# Patient Record
Sex: MALE | Race: OTHER | URBAN - METROPOLITAN AREA
[De-identification: names, ages, dates, MRNs, and addresses within clinical notes are randomized per-mention and may not be internally consistent; named-entity substitution may affect disease eponyms.]

---

## 2018-12-17 ENCOUNTER — IMPORTED ENCOUNTER (OUTPATIENT)
Dept: URBAN - METROPOLITAN AREA CLINIC 50 | Facility: CLINIC | Age: 83
End: 2018-12-17

## 2020-03-04 ENCOUNTER — IMPORTED ENCOUNTER (OUTPATIENT)
Dept: URBAN - METROPOLITAN AREA CLINIC 50 | Facility: CLINIC | Age: 85
End: 2020-03-04

## 2021-04-17 ASSESSMENT — VISUAL ACUITY
OS_CC: J1+
OD_CC: J1+
OD_CC: J1+
OS_CC: 20/20
OS_CC: J1+
OS_CC: 20/20
OD_CC: 20/20
OD_CC: 20/20

## 2021-04-17 ASSESSMENT — TONOMETRY
OS_IOP_MMHG: 15
OD_IOP_MMHG: 16
OS_IOP_MMHG: 18
OD_IOP_MMHG: 18

## 2021-07-26 ENCOUNTER — PREPPED CHART (OUTPATIENT)
Dept: URBAN - METROPOLITAN AREA CLINIC 49 | Facility: CLINIC | Age: 86
End: 2021-07-26

## 2021-07-30 ENCOUNTER — ANNUAL COMPREHENSIVE EXAM (OUTPATIENT)
Dept: URBAN - METROPOLITAN AREA CLINIC 49 | Facility: CLINIC | Age: 86
End: 2021-07-30

## 2021-07-30 DIAGNOSIS — H53.2: ICD-10-CM

## 2021-07-30 DIAGNOSIS — H04.123: ICD-10-CM

## 2021-07-30 DIAGNOSIS — H43.813: ICD-10-CM

## 2021-07-30 DIAGNOSIS — H35.362: ICD-10-CM

## 2021-07-30 DIAGNOSIS — E11.9: ICD-10-CM

## 2021-07-30 PROCEDURE — 92134 CPTRZ OPH DX IMG PST SGM RTA: CPT

## 2021-07-30 PROCEDURE — 92014 COMPRE OPH EXAM EST PT 1/>: CPT

## 2021-07-30 ASSESSMENT — VISUAL ACUITY
OS_CC: J1+@16"
OD_SC: 20/30
OS_SC: 20/40
OU_CC: 20/20
OD_CC: J1+@16"
OS_SC: J7@16"
OU_CC: J1+@16"
OD_CC: 20/25-1
OD_SC: J7@16"
OS_CC: 20/20-1

## 2021-07-30 ASSESSMENT — KERATOMETRY
OD_AXISANGLE2_DEGREES: 008
OS_K1POWER_DIOPTERS: 43.25
OD_K1POWER_DIOPTERS: 43.50
OD_AXISANGLE_DEGREES: 98
OD_K2POWER_DIOPTERS: 44.25
OS_AXISANGLE_DEGREES: 066
OS_AXISANGLE2_DEGREES: 156
OS_K2POWER_DIOPTERS: 44.50

## 2021-07-30 ASSESSMENT — TONOMETRY
OS_IOP_MMHG: 13
OD_IOP_MMHG: 19

## 2021-07-30 NOTE — PATIENT DISCUSSION
Patient currently has prism in his glasses and is currently experiencing intermittent diplopia. Will schedule patient for Motility check.

## 2021-09-21 ENCOUNTER — MOTILITY CHECK (OUTPATIENT)
Dept: URBAN - METROPOLITAN AREA CLINIC 49 | Facility: CLINIC | Age: 86
End: 2021-09-21

## 2021-09-21 DIAGNOSIS — H53.2: ICD-10-CM

## 2021-09-21 PROCEDURE — 92015 DETERMINE REFRACTIVE STATE: CPT

## 2021-09-21 PROCEDURE — 92060 SENSORIMOTOR EXAMINATION: CPT

## 2021-09-21 ASSESSMENT — VISUAL ACUITY
OS_CC: J1
OD_CC: 20/20
OS_CC: 20/20
OU_CC: J1+
OD_CC: J1
OU_CC: 20/20

## 2021-09-21 ASSESSMENT — KERATOMETRY
OD_K2POWER_DIOPTERS: 44.25
OD_AXISANGLE2_DEGREES: 008
OS_AXISANGLE2_DEGREES: 156
OD_AXISANGLE_DEGREES: 98
OS_AXISANGLE_DEGREES: 066
OS_K1POWER_DIOPTERS: 43.25
OS_K2POWER_DIOPTERS: 44.50
OD_K1POWER_DIOPTERS: 43.50

## 2021-09-21 NOTE — PATIENT DISCUSSION
Educated patient on today's findings. Alternating Esotropia at distance. Mild exophoria at near. EOMS full, no APD.

## 2021-09-21 NOTE — PATIENT DISCUSSION
Patient is happy with current TF at distance. Patient denies diplopia at distance with TF. Recommend NVOs for reading due to difference in prism between distance/near. Patient did not accept any prism at near.

## 2022-10-07 ENCOUNTER — ESTABLISHED PATIENT (OUTPATIENT)
Dept: URBAN - METROPOLITAN AREA CLINIC 53 | Facility: CLINIC | Age: 87
End: 2022-10-07

## 2022-10-07 DIAGNOSIS — H04.123: ICD-10-CM

## 2022-10-07 DIAGNOSIS — H53.2: ICD-10-CM

## 2022-10-07 DIAGNOSIS — E11.9: ICD-10-CM

## 2022-10-07 DIAGNOSIS — H43.813: ICD-10-CM

## 2022-10-07 DIAGNOSIS — H52.4: ICD-10-CM

## 2022-10-07 DIAGNOSIS — H35.362: ICD-10-CM

## 2022-10-07 PROCEDURE — 92134 CPTRZ OPH DX IMG PST SGM RTA: CPT

## 2022-10-07 PROCEDURE — 92014 COMPRE OPH EXAM EST PT 1/>: CPT

## 2022-10-07 ASSESSMENT — KERATOMETRY
OD_K2POWER_DIOPTERS: 44.25
OD_K1POWER_DIOPTERS: 43.50
OS_K1POWER_DIOPTERS: 43.25
OS_AXISANGLE2_DEGREES: 156
OS_K2POWER_DIOPTERS: 44.50
OD_AXISANGLE2_DEGREES: 008
OS_AXISANGLE_DEGREES: 066
OD_AXISANGLE_DEGREES: 98

## 2022-10-07 ASSESSMENT — VISUAL ACUITY
OD_CC: 20/20-2
OU_CC: 20/20
OU_CC: J1 @ 14"
OS_CC: 20/25+2

## 2022-10-07 ASSESSMENT — TONOMETRY
OD_IOP_MMHG: 18
OS_IOP_MMHG: 16

## 2022-10-07 NOTE — PATIENT DISCUSSION
Patient is having issues with some horizontal diplopia at distance. Discussed with patient to return for Motility exam at patient convenience. Patient states double vision is not bothersome at this time.

## 2023-11-28 ENCOUNTER — COMPREHENSIVE EXAM (OUTPATIENT)
Dept: URBAN - METROPOLITAN AREA CLINIC 49 | Facility: LOCATION | Age: 88
End: 2023-11-28

## 2023-11-28 DIAGNOSIS — H02.834: ICD-10-CM

## 2023-11-28 DIAGNOSIS — H53.2: ICD-10-CM

## 2023-11-28 DIAGNOSIS — H35.373: ICD-10-CM

## 2023-11-28 DIAGNOSIS — E11.9: ICD-10-CM

## 2023-11-28 DIAGNOSIS — H35.363: ICD-10-CM

## 2023-11-28 DIAGNOSIS — H02.831: ICD-10-CM

## 2023-11-28 DIAGNOSIS — H43.813: ICD-10-CM

## 2023-11-28 DIAGNOSIS — H52.4: ICD-10-CM

## 2023-11-28 PROCEDURE — 92014 COMPRE OPH EXAM EST PT 1/>: CPT

## 2023-11-28 PROCEDURE — 92134 CPTRZ OPH DX IMG PST SGM RTA: CPT

## 2023-11-28 PROCEDURE — 92015 DETERMINE REFRACTIVE STATE: CPT

## 2023-11-28 ASSESSMENT — VISUAL ACUITY
OD_CC: 20/20-2
OU_CC: J1
OS_CC: 20/20-2

## 2023-11-28 ASSESSMENT — KERATOMETRY
OS_AXISANGLE2_DEGREES: 156
OS_K1POWER_DIOPTERS: 43.25
OD_K2POWER_DIOPTERS: 44.25
OS_K2POWER_DIOPTERS: 44.50
OD_AXISANGLE2_DEGREES: 008
OS_AXISANGLE_DEGREES: 066
OD_AXISANGLE_DEGREES: 98
OD_K1POWER_DIOPTERS: 43.50

## 2023-11-28 ASSESSMENT — TONOMETRY
OD_IOP_MMHG: 16
OS_IOP_MMHG: 16